# Patient Record
Sex: MALE | Race: WHITE | NOT HISPANIC OR LATINO | URBAN - METROPOLITAN AREA
[De-identification: names, ages, dates, MRNs, and addresses within clinical notes are randomized per-mention and may not be internally consistent; named-entity substitution may affect disease eponyms.]

---

## 2025-07-06 ENCOUNTER — OFFICE VISIT (OUTPATIENT)
Dept: URGENT CARE | Age: 25
End: 2025-07-06
Payer: COMMERCIAL

## 2025-07-06 VITALS
HEART RATE: 92 BPM | DIASTOLIC BLOOD PRESSURE: 77 MMHG | SYSTOLIC BLOOD PRESSURE: 119 MMHG | RESPIRATION RATE: 14 BRPM | OXYGEN SATURATION: 98 % | BODY MASS INDEX: 23.06 KG/M2 | WEIGHT: 170 LBS

## 2025-07-06 DIAGNOSIS — H61.23 BILATERAL IMPACTED CERUMEN: Primary | ICD-10-CM

## 2025-07-06 NOTE — PROGRESS NOTES
Subjective   Patient ID: Tariq Astudillo is a 25 y.o. male. They present today with a chief complaint of left ear issue.    History of Present Illness  Tariq is a 25 year old male presents to  with L ear irritation and diminished hearing over the past few days. Started after he was swimming earlier in the week. No recent cough, cold or congestion. No fevers. He did use OTC ear drops with no improvement.           Past Medical History  Allergies as of 07/06/2025    (No Known Allergies)       Prescriptions Prior to Admission[1]     Medical History[2]    Surgical History[3]     reports that he has never smoked. He has never used smokeless tobacco. He reports that he does not drink alcohol.    Review of Systems  Review of Systems                               Objective    Vitals:    07/06/25 1456   BP: 119/77   Pulse: 92   Resp: 14   SpO2: 98%   Weight: 77.1 kg (170 lb)     No LMP for male patient.    Physical Exam  Vitals and nursing note reviewed.   Constitutional:       Appearance: Normal appearance.   HENT:      Head: Normocephalic and atraumatic.      Right Ear: There is impacted cerumen.      Left Ear: There is impacted cerumen.      Ears:      Comments: Post irrigation with b/l EAC clear, TM Wnl.   Cardiovascular:      Rate and Rhythm: Normal rate.   Pulmonary:      Effort: Pulmonary effort is normal.   Musculoskeletal:      Cervical back: Normal range of motion and neck supple.   Lymphadenopathy:      Cervical: No cervical adenopathy.   Skin:     General: Skin is warm and dry.   Neurological:      Mental Status: He is alert and oriented to person, place, and time.         Ear Cerumen Removal    Date/Time: 7/6/2025 3:35 PM    Performed by: Annette De La Garza PA-C  Authorized by: Annette De La Garza PA-C    Consent:     Consent obtained:  Verbal    Consent given by:  Patient    Risks discussed:  Bleeding, dizziness, TM perforation and pain  Procedure details:     Location:  L ear and R ear    Procedure type: irrigation       Procedure outcomes: cerumen removed    Post-procedure details:     Inspection:  No bleeding, TM intact and ear canal clear    Procedure completion:  Tolerated well, no immediate complications  Comments:      Done by RT Faviola       Point of Care Test & Imaging Results from this visit  No results found for this visit on 07/06/25.   Imaging  No results found.    Cardiology, Vascular, and Other Imaging  No other imaging results found for the past 2 days      Diagnostic study results (if any) were reviewed by Annette De La Garza PA-C.    Assessment/Plan   Allergies, medications, history, and pertinent labs/EKGs/Imaging reviewed by Annette De La Garza PA-C.     Medical Decision Making  The patient presented with symptoms consistent with cerumen impaction, including ear fullness, decreased hearing, and mild discomfort. On otoscopic examination, the external auditory canal was noted to be occluded with cerumen, obscuring visualization of the tympanic membrane.  After discussing treatment options, the decision was made to proceed with manual removal and/or irrigation. The patient tolerated the procedure well with partial to complete resolution of symptoms. No signs of secondary infection or trauma were noted post-procedure. The patient was advised on at-home cerumen management strategies and signs that would warrant follow-up.      Orders and Diagnoses  Diagnoses and all orders for this visit:  Bilateral impacted cerumen  Other orders  -     Ear Cerumen Removal      Medical Admin Record      Patient disposition: Home    Electronically signed by Annette De La Garza PA-C  3:35 PM           [1] (Not in a hospital admission)   [2]   Past Medical History:  Diagnosis Date    Anxiety disorder, unspecified     Anxiety    Other conditions influencing health status     Gestation period, 41 weeks    Other specified health status      infant    Personal history of other mental and behavioral disorders     History of attention  deficit hyperactivity disorder   [3] No past surgical history on file.